# Patient Record
Sex: FEMALE | Race: ASIAN | Employment: FULL TIME | URBAN - METROPOLITAN AREA
[De-identification: names, ages, dates, MRNs, and addresses within clinical notes are randomized per-mention and may not be internally consistent; named-entity substitution may affect disease eponyms.]

---

## 2017-06-23 ENCOUNTER — OFFICE VISIT (OUTPATIENT)
Dept: FAMILY MEDICINE CLINIC | Facility: CLINIC | Age: 22
End: 2017-06-23

## 2017-06-23 VITALS
HEIGHT: 60 IN | SYSTOLIC BLOOD PRESSURE: 112 MMHG | OXYGEN SATURATION: 99 % | DIASTOLIC BLOOD PRESSURE: 66 MMHG | WEIGHT: 125 LBS | TEMPERATURE: 99 F | RESPIRATION RATE: 18 BRPM | HEART RATE: 66 BPM | BODY MASS INDEX: 24.54 KG/M2

## 2017-06-23 DIAGNOSIS — Z11.8 SCREENING FOR CHLAMYDIAL DISEASE: ICD-10-CM

## 2017-06-23 DIAGNOSIS — Z01.419 ENCOUNTER FOR GYNECOLOGICAL EXAMINATION WITHOUT ABNORMAL FINDING: Primary | ICD-10-CM

## 2017-06-23 DIAGNOSIS — Z12.4 SCREENING FOR MALIGNANT NEOPLASM OF CERVIX: ICD-10-CM

## 2017-06-23 PROCEDURE — 87624 HPV HI-RISK TYP POOLED RSLT: CPT | Performed by: FAMILY MEDICINE

## 2017-06-23 PROCEDURE — 88175 CYTOPATH C/V AUTO FLUID REDO: CPT | Performed by: FAMILY MEDICINE

## 2017-06-23 PROCEDURE — 87625 HPV TYPES 16 & 18 ONLY: CPT | Performed by: FAMILY MEDICINE

## 2017-06-23 PROCEDURE — 87591 N.GONORRHOEAE DNA AMP PROB: CPT | Performed by: FAMILY MEDICINE

## 2017-06-23 PROCEDURE — 87491 CHLMYD TRACH DNA AMP PROBE: CPT | Performed by: FAMILY MEDICINE

## 2017-06-23 PROCEDURE — 99395 PREV VISIT EST AGE 18-39: CPT | Performed by: FAMILY MEDICINE

## 2017-06-23 RX ORDER — LEVONORGESTREL AND ETHINYL ESTRADIOL 0.1-0.02MG
1 KIT ORAL
Qty: 1 PACKAGE | Refills: 11 | Status: SHIPPED | OUTPATIENT
Start: 2017-06-23 | End: 2017-10-09

## 2017-06-23 NOTE — PROGRESS NOTES
Tera Flor is a 24year old female who presents for a complete physical exam.   HPI:     Patient presents with:  Physical      Patient feels well, dental visit up to date, no hearing problem. Pt is going to [address] [place] [address] nursing program.  Vaccinations up complaint  HEMATOLOGY: denies hx anemia; denies bruising or excessive bleeding  ENDOCRINE: denies excessive thirst or urination; denies unexpected wt gain or wt loss  ALLERGY/IMM.: denies food or seasonal allergies  PSYCH: no symptoms of depression or anxi blood clothes. Benefits discussed as well. The patient indicates understanding of these issues and agrees to the plan.     Pending Prescriptions Disp Refills    Levonorgestrel-Ethinyl Estrad (LUTERA) 0.1-20 MG-MCG Oral Tab 1 Package 11     Sig: Take 1 tab

## 2017-06-23 NOTE — PATIENT INSTRUCTIONS
The Range of Pap Test Results  When your Pap test is sent to the lab, the lab studies your cell samples and reports any abnormal cell changes. Your health care provider can discuss these changes with you.  In some cases, an abnormal Pap test is due to an · Mild dysplasia: Cells show distinct changes. More testing or HPV typing may be done. You may also have treatment to destroy or remove problem cells. (Reported as low-grade SCOTT or KANU 1.)  · Moderate to severe dysplasia: Cells show precancerous changes.  O Be sure to discuss your results with your health care provider. Find out about any follow-up tests you’ll need. You may be asked to come back for a second Pap test in a few months.  Or, you may be scheduled for an exam so your health care provider can get a

## 2017-06-28 ENCOUNTER — TELEPHONE (OUTPATIENT)
Dept: FAMILY MEDICINE CLINIC | Facility: CLINIC | Age: 22
End: 2017-06-28

## 2017-06-28 NOTE — TELEPHONE ENCOUNTER
----- Message from Justina Camara MD sent at 6/28/2017 10:39 AM CDT -----  Needs Pap in 6 months. HPV positive, abnormal Pap.

## 2017-06-29 RX ORDER — LEVONORGESTREL AND ETHINYL ESTRADIOL 0.1-0.02MG
KIT ORAL
Qty: 84 TABLET | Refills: 11 | OUTPATIENT
Start: 2017-06-29

## 2017-06-29 RX ORDER — LEVONORGESTREL AND ETHINYL ESTRADIOL 0.1-0.02MG
1 KIT ORAL
Qty: 28 TABLET | Refills: 11 | Status: SHIPPED | OUTPATIENT
Start: 2017-06-29 | End: 2018-07-17

## 2017-06-29 NOTE — TELEPHONE ENCOUNTER
Pt notified of Pap, HPV GC/Chlam results & below orders. All questions answered, pt expresses understanding.

## 2017-10-09 ENCOUNTER — OFFICE VISIT (OUTPATIENT)
Dept: FAMILY MEDICINE CLINIC | Facility: CLINIC | Age: 22
End: 2017-10-09

## 2017-10-09 VITALS
HEART RATE: 63 BPM | HEIGHT: 60 IN | WEIGHT: 127 LBS | TEMPERATURE: 98 F | DIASTOLIC BLOOD PRESSURE: 68 MMHG | OXYGEN SATURATION: 98 % | RESPIRATION RATE: 16 BRPM | BODY MASS INDEX: 24.94 KG/M2 | SYSTOLIC BLOOD PRESSURE: 110 MMHG

## 2017-10-09 DIAGNOSIS — K62.5 BRBPR (BRIGHT RED BLOOD PER RECTUM): Primary | ICD-10-CM

## 2017-10-09 PROCEDURE — 99213 OFFICE O/P EST LOW 20 MIN: CPT | Performed by: PHYSICIAN ASSISTANT

## 2017-10-09 NOTE — PROGRESS NOTES
CHIEF COMPLAINT:   Patient presents with:  Blood In Stool: poss stress related, bright and more then last time, started on and off for a few months         HPI:   Lola Park is a 25year old female who presents for complaints of blood in her stool th - increase fiber and push fluids to prevent constipation  - pt doesn't want treatment for hemorrhoids at this time as she is not in pain. - F/u PRN.     Meds & Refills for this Visit:  No prescriptions requested or ordered in this encounter      Patient In · Minor occasional drops of blood that appear on the stool or toilet paper, to greater amounts of stool that appear black or tarry   Rectal bleeding can also happen without pain.   Date Last Reviewed: 7/1/2016  © 9645-1344 The [facility],Third Floor

## 2017-10-09 NOTE — PATIENT INSTRUCTIONS
Understanding Rectal Bleeding    Rectal bleeding is when blood passes through your rectum and anus. It can happen with or without a bowel movement. Rectal bleeding may be a sign of a serious problem in your rectum, colon, or upper GI tract.  Call your hea © 5778-0724 [address], [address] [place]. All rights reserved. This information is not intended as a substitute for professional medical care. Always follow your healthcare professional's instructions.

## 2018-03-02 ENCOUNTER — TELEPHONE (OUTPATIENT)
Dept: FAMILY MEDICINE CLINIC | Facility: CLINIC | Age: 23
End: 2018-03-02

## 2018-03-02 NOTE — TELEPHONE ENCOUNTER
Spoke to patient about her 6 elaine repeat pap that was due in December.   She scheduled for 3-10-18

## 2018-03-10 ENCOUNTER — TELEPHONE (OUTPATIENT)
Dept: FAMILY MEDICINE CLINIC | Facility: CLINIC | Age: 23
End: 2018-03-10

## 2018-03-10 NOTE — TELEPHONE ENCOUNTER
Tried to call cell number [phone] no  set up had no other choice but to call the home number and left message regarding no show fee as well as for her to call the office on Monday to schedule another appointment

## 2018-03-10 NOTE — TELEPHONE ENCOUNTER
Patient left voicemail on 3/10/18 at 9:38am to cancel this appointment. Please do not charge this patient for the no-show.

## 2018-03-20 NOTE — TELEPHONE ENCOUNTER
Message sent to pt via [facility] below to call the office to re-schedule her appt for repeat Pap Smear.

## 2018-04-05 ENCOUNTER — TELEPHONE (OUTPATIENT)
Dept: FAMILY MEDICINE CLINIC | Facility: CLINIC | Age: 23
End: 2018-04-05

## 2018-04-05 NOTE — TELEPHONE ENCOUNTER
Spoke with patient regarding follow up pap smear appointment. Patient states understanding and was transferred to phone room to schedule and appointment. Pt was scheduled for 4/24/2018 for repeat pap.

## 2018-07-17 RX ORDER — LEVONORGESTREL AND ETHINYL ESTRADIOL 0.1-0.02MG
1 KIT ORAL
Qty: 28 TABLET | Refills: 2 | Status: SHIPPED | OUTPATIENT
Start: 2018-07-17 | End: 2018-08-18

## 2018-08-01 ENCOUNTER — TELEPHONE (OUTPATIENT)
Dept: FAMILY MEDICINE CLINIC | Facility: CLINIC | Age: 23
End: 2018-08-01

## 2018-08-18 ENCOUNTER — OFFICE VISIT (OUTPATIENT)
Dept: FAMILY MEDICINE CLINIC | Facility: CLINIC | Age: 23
End: 2018-08-18
Payer: COMMERCIAL

## 2018-08-18 VITALS
BODY MASS INDEX: 25.52 KG/M2 | RESPIRATION RATE: 16 BRPM | SYSTOLIC BLOOD PRESSURE: 100 MMHG | HEART RATE: 80 BPM | WEIGHT: 130 LBS | DIASTOLIC BLOOD PRESSURE: 60 MMHG | OXYGEN SATURATION: 98 % | HEIGHT: 60 IN

## 2018-08-18 DIAGNOSIS — R87.618 ABNORMAL PAPANICOLAOU SMEAR OF CERVIX WITH POSITIVE HUMAN PAPILLOMA VIRUS (HPV) TEST: Primary | ICD-10-CM

## 2018-08-18 DIAGNOSIS — Z30.40 ENCOUNTER FOR REFILL OF PRESCRIPTION FOR CONTRACEPTION: ICD-10-CM

## 2018-08-18 LAB
CONTROL LINE PRESENT WITH A CLEAR BACKGROUND (YES/NO): YES YES/NO
KIT LOT #: NORMAL NUMERIC
PREGNANCY TEST, URINE: NEGATIVE

## 2018-08-18 PROCEDURE — 88175 CYTOPATH C/V AUTO FLUID REDO: CPT | Performed by: NURSE PRACTITIONER

## 2018-08-18 PROCEDURE — 87624 HPV HI-RISK TYP POOLED RSLT: CPT | Performed by: NURSE PRACTITIONER

## 2018-08-18 PROCEDURE — 81025 URINE PREGNANCY TEST: CPT | Performed by: NURSE PRACTITIONER

## 2018-08-18 PROCEDURE — 99395 PREV VISIT EST AGE 18-39: CPT | Performed by: NURSE PRACTITIONER

## 2018-08-18 RX ORDER — LEVONORGESTREL AND ETHINYL ESTRADIOL 0.1-0.02MG
1 KIT ORAL
Qty: 28 TABLET | Refills: 5 | Status: SHIPPED | OUTPATIENT
Start: 2018-08-18 | End: 2018-08-18

## 2018-08-18 NOTE — PATIENT INSTRUCTIONS
Birth Control: The Pill    Birth control pills contain hormones that help prevent pregnancy. The pills are prescribed by your healthcare provider. There are many types of birth control pills available.  If you have side effects from one type of pill, tell In these cases, discuss the risks with your healthcare provider. Date Last Reviewed: 3/1/2017  © 2035-0256 The [employer] [address]. [address] [facility], [address] [place] [address]. All rights reserved.  This information is not intended as a substitute for prof

## 2018-08-18 NOTE — PROGRESS NOTES
[facility]   PROGRESS NOTE  Chief Complaint:   Patient presents with:  Gyn Exam: Pap smear      HPI:   This is a 25year old female coming in for  Follow up PAP    Well women visit: Patient is here for the physical , history provided by the jose alfredo Encounter For Gynecological Examination Without Abnormal Finding. Z12.4 Screening For Malignant Neoplasm Of Cervix.    [FORMATTING REMOVED]    Reason for testing Screening    LMP 6/9/17    Menstrual Status Abnormal Bleeding    Gyn Additional Information • lung cancer [OTHER] Maternal Grandfather      Allergies:  No Known Allergies  Current Meds:    Current Outpatient Prescriptions:  LESSINA 0.1-20 MG-MCG Oral Tab TAKE 1 TABLET BY MOUTH ONCE DAILY Disp: 28 tablet Rfl: 2      Counseling given: Not Answere lesion, no bruising, good turgor. HEART:  Regular rate and rhythm, no murmurs, rubs or gallops. LUNGS: Clear to auscultation bilterally, no rales/rhonchi/wheezing.   ABDOMEN:  Soft, nondistended, nontender, bowel sounds normal in all 4 quadrants, no jose l worsening or changing symptoms. Patient is to call with any side effects or complications from the treatments as a result of today.      Problem List:  Patient Active Problem List:  (none) - all problems resolved or deleted      DIMITRIOS Sánchez  8/18/

## 2018-08-20 LAB — HPV I/H RISK 1 DNA SPEC QL NAA+PROBE: NEGATIVE

## 2018-08-20 RX ORDER — LEVONORGESTREL AND ETHINYL ESTRADIOL 0.1-0.02MG
KIT ORAL
Qty: 84 TABLET | Refills: 1 | Status: SHIPPED | OUTPATIENT
Start: 2018-08-20 | End: 2019-04-06

## 2019-02-04 DIAGNOSIS — Z30.40 ENCOUNTER FOR REFILL OF PRESCRIPTION FOR CONTRACEPTION: ICD-10-CM

## 2019-02-04 RX ORDER — LEVONORGESTREL AND ETHINYL ESTRADIOL 0.1-0.02MG
KIT ORAL
Qty: 28 TABLET | Refills: 0 | Status: SHIPPED | OUTPATIENT
Start: 2019-02-04 | End: 2019-03-06

## 2019-02-04 NOTE — TELEPHONE ENCOUNTER
Medication(s) to Refill:   Requested Prescriptions     Pending Prescriptions Disp Refills   • VIENVA 0.1-20 MG-MCG Oral Tab [Pharmacy Med Name: Thaddeus Megan 0.1MG/0.02MG TABS 28S] 28 tablet 0     Si TABLET BY MOUTH EVERY DAY         Reason for Medication Ref

## 2019-03-06 DIAGNOSIS — Z30.40 ENCOUNTER FOR REFILL OF PRESCRIPTION FOR CONTRACEPTION: ICD-10-CM

## 2019-03-07 RX ORDER — LEVONORGESTREL AND ETHINYL ESTRADIOL 0.1-0.02MG
KIT ORAL
Qty: 28 TABLET | Refills: 0 | Status: SHIPPED | OUTPATIENT
Start: 2019-03-07 | End: 2019-04-06

## 2019-04-06 ENCOUNTER — OFFICE VISIT (OUTPATIENT)
Dept: FAMILY MEDICINE CLINIC | Facility: CLINIC | Age: 24
End: 2019-04-06
Payer: COMMERCIAL

## 2019-04-06 VITALS
DIASTOLIC BLOOD PRESSURE: 70 MMHG | OXYGEN SATURATION: 98 % | RESPIRATION RATE: 16 BRPM | TEMPERATURE: 98 F | WEIGHT: 134 LBS | HEIGHT: 60 IN | HEART RATE: 68 BPM | SYSTOLIC BLOOD PRESSURE: 110 MMHG | BODY MASS INDEX: 26.31 KG/M2

## 2019-04-06 DIAGNOSIS — Z13.29 SCREENING FOR ENDOCRINE, NUTRITIONAL, METABOLIC AND IMMUNITY DISORDER: ICD-10-CM

## 2019-04-06 DIAGNOSIS — Z13.21 SCREENING FOR ENDOCRINE, NUTRITIONAL, METABOLIC AND IMMUNITY DISORDER: ICD-10-CM

## 2019-04-06 DIAGNOSIS — Z13.0 SCREENING FOR ENDOCRINE, NUTRITIONAL, METABOLIC AND IMMUNITY DISORDER: ICD-10-CM

## 2019-04-06 DIAGNOSIS — Z13.228 SCREENING FOR ENDOCRINE, NUTRITIONAL, METABOLIC AND IMMUNITY DISORDER: ICD-10-CM

## 2019-04-06 DIAGNOSIS — Z30.40 ENCOUNTER FOR REFILL OF PRESCRIPTION FOR CONTRACEPTION: Primary | ICD-10-CM

## 2019-04-06 PROCEDURE — 99213 OFFICE O/P EST LOW 20 MIN: CPT | Performed by: NURSE PRACTITIONER

## 2019-04-06 RX ORDER — LEVONORGESTREL AND ETHINYL ESTRADIOL 0.1-0.02MG
KIT ORAL
Qty: 3 PACKAGE | Refills: 3 | Status: SHIPPED | OUTPATIENT
Start: 2019-04-06 | End: 2020-04-01

## 2019-04-06 NOTE — PATIENT INSTRUCTIONS
Ethinyl Estradiol;  Levonorgestrel tablets  Brand Names: Max Nichols, Lucille, Kirk Hair, Lexii, Barron, Aubra-28, Leonardo, Roxy Ramsey [place], [place], Toshia, KENDRA, [place], [place], [place], [place], Rojelio, [facility], · sudden loss of coordination, especially on one side of the body  · speech problems  · symptoms of vaginal infection like itching, irritation or unusual discharge  · tenderness in the upper abdomen  · vomiting  · weakness or numbness in the arms or legs, What if I miss a dose? If you miss a dose, refer to the patient information sheet you received with your medicine for direction. If you miss more than one pill, this medicine may not be as effective and you may need to use another form of birth control. This medicine can make your body retain fluid, making your fingers, hands, or ankles swell. Your blood pressure can go up. Contact your doctor or health care professional if you feel you are retaining fluid.   This medicine can make you more sensitive to th

## 2019-04-06 NOTE — PROGRESS NOTES
Chief Complaint:   Patient presents with:  Medication Follow-Up: birth control     HPI:   This is a 21year old female presenting for refill of OCP. Using for pregnancy prevention. LMP 04/0319. Periods are regular- every 28 days.  No menorrhagia or dysmenor for bladder incontinence, dysuria, urgency, frequency, hematuria, flank pain, vaginal bleeding, vaginal discharge, difficulty urinating, genital sores, vaginal pain, menstrual problem, pelvic pain, dyspareunia, breast mass and breast pain.    Cintiha Chew Encounter for refill of prescription for contraception  - Levonorgestrel-Ethinyl Estrad (Kiera Silva) 0.1-20 MG-MCG Oral Tab; 1 TABLET BY MOUTH EVERY DAY    -Encouraged safe sex with every sexual encounter.   -Pap due in August 2021.  Physical exam due this comi

## 2019-09-19 ENCOUNTER — LAB ENCOUNTER (OUTPATIENT)
Dept: LAB | Age: 24
End: 2019-09-19
Attending: NURSE PRACTITIONER
Payer: COMMERCIAL

## 2019-09-19 DIAGNOSIS — Z13.0 SCREENING FOR ENDOCRINE, NUTRITIONAL, METABOLIC AND IMMUNITY DISORDER: ICD-10-CM

## 2019-09-19 DIAGNOSIS — Z13.21 SCREENING FOR ENDOCRINE, NUTRITIONAL, METABOLIC AND IMMUNITY DISORDER: ICD-10-CM

## 2019-09-19 DIAGNOSIS — Z13.29 SCREENING FOR ENDOCRINE, NUTRITIONAL, METABOLIC AND IMMUNITY DISORDER: ICD-10-CM

## 2019-09-19 DIAGNOSIS — Z13.228 SCREENING FOR ENDOCRINE, NUTRITIONAL, METABOLIC AND IMMUNITY DISORDER: ICD-10-CM

## 2019-09-19 LAB
ALBUMIN SERPL-MCNC: 4 G/DL (ref 3.4–5)
ALBUMIN/GLOB SERPL: 1.1 {RATIO} (ref 1–2)
ALP LIVER SERPL-CCNC: 37 U/L (ref 52–144)
ALT SERPL-CCNC: 17 U/L (ref 13–56)
ANION GAP SERPL CALC-SCNC: 8 MMOL/L (ref 0–18)
AST SERPL-CCNC: 24 U/L (ref 15–37)
BASOPHILS # BLD AUTO: 0.06 X10(3) UL (ref 0–0.2)
BASOPHILS NFR BLD AUTO: 0.7 %
BILIRUB SERPL-MCNC: 0.6 MG/DL (ref 0.1–2)
BUN BLD-MCNC: 13 MG/DL (ref 7–18)
BUN/CREAT SERPL: 15.5 (ref 10–20)
CALCIUM BLD-MCNC: 9.5 MG/DL (ref 8.5–10.1)
CHLORIDE SERPL-SCNC: 107 MMOL/L (ref 98–112)
CHOLEST SMN-MCNC: 230 MG/DL (ref ?–200)
CO2 SERPL-SCNC: 26 MMOL/L (ref 21–32)
CREAT BLD-MCNC: 0.84 MG/DL (ref 0.55–1.02)
DEPRECATED RDW RBC AUTO: 42.9 FL (ref 35.1–46.3)
EOSINOPHIL # BLD AUTO: 0.29 X10(3) UL (ref 0–0.7)
EOSINOPHIL NFR BLD AUTO: 3.4 %
ERYTHROCYTE [DISTWIDTH] IN BLOOD BY AUTOMATED COUNT: 12 % (ref 11–15)
GLOBULIN PLAS-MCNC: 3.8 G/DL (ref 2.8–4.4)
GLUCOSE BLD-MCNC: 84 MG/DL (ref 70–99)
HCT VFR BLD AUTO: 41.5 % (ref 35–48)
HDLC SERPL-MCNC: 65 MG/DL (ref 40–59)
HGB BLD-MCNC: 13.6 G/DL (ref 12–16)
IMM GRANULOCYTES # BLD AUTO: 0.03 X10(3) UL (ref 0–1)
IMM GRANULOCYTES NFR BLD: 0.4 %
LDLC SERPL CALC-MCNC: 147 MG/DL (ref ?–100)
LYMPHOCYTES # BLD AUTO: 1.99 X10(3) UL (ref 1–4)
LYMPHOCYTES NFR BLD AUTO: 23.2 %
M PROTEIN MFR SERPL ELPH: 7.8 G/DL (ref 6.4–8.2)
MCH RBC QN AUTO: 31.6 PG (ref 26–34)
MCHC RBC AUTO-ENTMCNC: 32.8 G/DL (ref 31–37)
MCV RBC AUTO: 96.3 FL (ref 80–100)
MONOCYTES # BLD AUTO: 0.3 X10(3) UL (ref 0.1–1)
MONOCYTES NFR BLD AUTO: 3.5 %
NEUTROPHILS # BLD AUTO: 5.9 X10 (3) UL (ref 1.5–7.7)
NEUTROPHILS # BLD AUTO: 5.9 X10(3) UL (ref 1.5–7.7)
NEUTROPHILS NFR BLD AUTO: 68.8 %
NONHDLC SERPL-MCNC: 165 MG/DL (ref ?–130)
OSMOLALITY SERPL CALC.SUM OF ELEC: 291 MOSM/KG (ref 275–295)
PLATELET # BLD AUTO: 313 10(3)UL (ref 150–450)
POTASSIUM SERPL-SCNC: 4.2 MMOL/L (ref 3.5–5.1)
RBC # BLD AUTO: 4.31 X10(6)UL (ref 3.8–5.3)
SODIUM SERPL-SCNC: 141 MMOL/L (ref 136–145)
TRIGL SERPL-MCNC: 89 MG/DL (ref 30–149)
TSI SER-ACNC: 0.83 MIU/ML (ref 0.36–3.74)
VLDLC SERPL CALC-MCNC: 18 MG/DL (ref 0–30)
WBC # BLD AUTO: 8.6 X10(3) UL (ref 4–11)

## 2019-09-19 PROCEDURE — 36415 COLL VENOUS BLD VENIPUNCTURE: CPT | Performed by: NURSE PRACTITIONER

## 2019-09-19 PROCEDURE — 80050 GENERAL HEALTH PANEL: CPT | Performed by: NURSE PRACTITIONER

## 2019-09-19 PROCEDURE — 80061 LIPID PANEL: CPT | Performed by: NURSE PRACTITIONER

## 2020-04-01 DIAGNOSIS — Z30.40 ENCOUNTER FOR REFILL OF PRESCRIPTION FOR CONTRACEPTION: ICD-10-CM

## 2020-04-01 RX ORDER — LEVONORGESTREL AND ETHINYL ESTRADIOL 0.1-0.02MG
KIT ORAL
Qty: 84 TABLET | Refills: 0 | Status: SHIPPED | OUTPATIENT
Start: 2020-04-01 | End: 2020-06-08

## 2020-04-01 NOTE — TELEPHONE ENCOUNTER
Medication(s) to Refill:   Requested Prescriptions     Pending Prescriptions Disp Refills   • VIENVA 0.1-20 MG-MCG Oral Tab [Pharmacy Med Name: Bari Vallejo 0.1MG/0.02MG [id number]] 84 tablet 0     Sig: TAKE 1 TABLET BY MOUTH EVERY DAY             Last Time Tari Richardson

## 2020-06-08 DIAGNOSIS — Z30.40 ENCOUNTER FOR REFILL OF PRESCRIPTION FOR CONTRACEPTION: ICD-10-CM

## 2020-06-08 RX ORDER — LEVONORGESTREL AND ETHINYL ESTRADIOL 0.1-0.02MG
KIT ORAL
Qty: 84 TABLET | Refills: 0 | Status: SHIPPED | OUTPATIENT
Start: 2020-06-08 | End: 2020-09-16

## 2020-06-08 NOTE — TELEPHONE ENCOUNTER
Medication(s) to Refill:   Requested Prescriptions     Pending Prescriptions Disp Refills   • VIENVA 0.1-20 MG-MCG Oral Tab [Pharmacy Med Name: Kaushik Rao 0.1MG/0.02MG [id number]] 84 tablet 0     Sig: TAKE 1 TABLET BY MOUTH EVERY DAY         Reason for Medication

## 2020-09-13 DIAGNOSIS — Z30.40 ENCOUNTER FOR REFILL OF PRESCRIPTION FOR CONTRACEPTION: ICD-10-CM

## 2020-09-14 NOTE — TELEPHONE ENCOUNTER
Medication(s) to Refill:   Requested Prescriptions     Pending Prescriptions Disp Refills   Johan Kee 0.1-20 MG-MCG Oral Tab [Pharmacy Med Name: Martine Valencia 0.1MG/0.02MG TABS 28S] 84 tablet 0     Sig: TAKE 1 TABLET BY MOUTH EVERY DAY         Reason for Medication Refill being sent to Provider / Reason Protocol Failed:  [x] 90 day refill has already been granted  [] Blood Pressure out of range  [] Labs Abnormal  [] Medication not previously prescribed by Provider  [x] Non-Protocol Medication  [] Controlled Substance     Last Time Medication was Filled:  6/8/2020      Last Office Visit with PCP: 4/6/2019      Future Appointments:  No future appointments.

## 2020-09-16 RX ORDER — TIMOLOL MALEATE 5 MG/ML
SOLUTION/ DROPS OPHTHALMIC
Qty: 84 TABLET | Refills: 0 | Status: SHIPPED | OUTPATIENT
Start: 2020-09-16 | End: 2020-12-14

## 2020-12-14 DIAGNOSIS — Z30.40 ENCOUNTER FOR REFILL OF PRESCRIPTION FOR CONTRACEPTION: ICD-10-CM

## 2020-12-14 RX ORDER — LEVONORGESTREL AND ETHINYL ESTRADIOL 0.1-0.02MG
1 KIT ORAL DAILY
Qty: 1 PACKAGE | Refills: 0 | Status: SHIPPED | OUTPATIENT
Start: 2020-12-14 | End: 2021-01-07

## 2020-12-14 NOTE — TELEPHONE ENCOUNTER
Pt calling in, stating that she is needing a refill on her VIENVA 0.1-20 MG-MCG Oral Tab to [id number], [place] - [address], [phone], [phone].  Please advise

## 2021-01-04 DIAGNOSIS — Z30.40 ENCOUNTER FOR REFILL OF PRESCRIPTION FOR CONTRACEPTION: ICD-10-CM

## 2021-01-04 RX ORDER — LEVONORGESTREL AND ETHINYL ESTRADIOL 0.1-0.02MG
KIT ORAL
Qty: 28 TABLET | Refills: 0 | OUTPATIENT
Start: 2021-01-04

## 2021-01-07 ENCOUNTER — OFFICE VISIT (OUTPATIENT)
Dept: FAMILY MEDICINE CLINIC | Facility: CLINIC | Age: 26
End: 2021-01-07
Payer: COMMERCIAL

## 2021-01-07 VITALS
HEART RATE: 76 BPM | SYSTOLIC BLOOD PRESSURE: 110 MMHG | HEIGHT: 60 IN | RESPIRATION RATE: 16 BRPM | WEIGHT: 130 LBS | OXYGEN SATURATION: 98 % | DIASTOLIC BLOOD PRESSURE: 70 MMHG | TEMPERATURE: 98 F | BODY MASS INDEX: 25.52 KG/M2

## 2021-01-07 DIAGNOSIS — Z00.00 LABORATORY EXAMINATION ORDERED AS PART OF A ROUTINE GENERAL MEDICAL EXAMINATION: ICD-10-CM

## 2021-01-07 DIAGNOSIS — Z00.00 ROUTINE GENERAL MEDICAL EXAMINATION AT A HEALTH CARE FACILITY: Primary | ICD-10-CM

## 2021-01-07 DIAGNOSIS — Z30.40 ENCOUNTER FOR REFILL OF PRESCRIPTION FOR CONTRACEPTION: ICD-10-CM

## 2021-01-07 PROCEDURE — 3078F DIAST BP <80 MM HG: CPT | Performed by: NURSE PRACTITIONER

## 2021-01-07 PROCEDURE — 99395 PREV VISIT EST AGE 18-39: CPT | Performed by: NURSE PRACTITIONER

## 2021-01-07 PROCEDURE — 3008F BODY MASS INDEX DOCD: CPT | Performed by: NURSE PRACTITIONER

## 2021-01-07 PROCEDURE — 3074F SYST BP LT 130 MM HG: CPT | Performed by: NURSE PRACTITIONER

## 2021-01-07 RX ORDER — LEVONORGESTREL AND ETHINYL ESTRADIOL 0.1-0.02MG
1 KIT ORAL DAILY
Qty: 3 PACKAGE | Refills: 11 | Status: SHIPPED | OUTPATIENT
Start: 2021-01-07

## 2021-01-07 NOTE — PROGRESS NOTES
Haydee Huynh is a 22year old female who presents for a complete physical exam, no gyn. HPI:     Patient presents with:  Wellness Visit      Patient is here for comprehensive physical. Reports  feels well, dental visit up to date, no hearing problem. unexpected wt gain or wt loss  ALLERGY/IMM.: denies food or seasonal allergies  PSYCH: no symptoms of depression or anxiety      EXAM:   /70   Pulse 76   Temp 98.1 °F (36.7 °C) (Oral)   Resp 16   Ht 5' (1.524 m)   Wt 130 lb (59 kg)   LMP 01/06/2021 exercise 30 minutes three times weekly. Health maintenance. The patient indicates understanding of these issues and agrees to the plan. The patient is asked to return for CPX in 1 year.

## 2021-01-09 ENCOUNTER — LAB ENCOUNTER (OUTPATIENT)
Dept: LAB | Age: 26
End: 2021-01-09
Attending: NURSE PRACTITIONER
Payer: COMMERCIAL

## 2021-01-09 DIAGNOSIS — Z00.00 LABORATORY EXAMINATION ORDERED AS PART OF A ROUTINE GENERAL MEDICAL EXAMINATION: ICD-10-CM

## 2021-01-09 LAB
ALBUMIN SERPL-MCNC: 3.9 G/DL (ref 3.4–5)
ALBUMIN/GLOB SERPL: 0.9 {RATIO} (ref 1–2)
ALP LIVER SERPL-CCNC: 50 U/L
ALT SERPL-CCNC: 21 U/L
ANION GAP SERPL CALC-SCNC: 5 MMOL/L (ref 0–18)
AST SERPL-CCNC: 18 U/L (ref 15–37)
BASOPHILS # BLD AUTO: 0.06 X10(3) UL (ref 0–0.2)
BASOPHILS NFR BLD AUTO: 1.1 %
BILIRUB SERPL-MCNC: 0.4 MG/DL (ref 0.1–2)
BUN BLD-MCNC: 16 MG/DL (ref 7–18)
BUN/CREAT SERPL: 18.6 (ref 10–20)
CALCIUM BLD-MCNC: 9.3 MG/DL (ref 8.5–10.1)
CHLORIDE SERPL-SCNC: 105 MMOL/L (ref 98–112)
CHOLEST SMN-MCNC: 267 MG/DL (ref ?–200)
CO2 SERPL-SCNC: 27 MMOL/L (ref 21–32)
CREAT BLD-MCNC: 0.86 MG/DL
DEPRECATED RDW RBC AUTO: 43.7 FL (ref 35.1–46.3)
EOSINOPHIL # BLD AUTO: 0.48 X10(3) UL (ref 0–0.7)
EOSINOPHIL NFR BLD AUTO: 8.6 %
ERYTHROCYTE [DISTWIDTH] IN BLOOD BY AUTOMATED COUNT: 12.4 % (ref 11–15)
GLOBULIN PLAS-MCNC: 4.3 G/DL (ref 2.8–4.4)
GLUCOSE BLD-MCNC: 87 MG/DL (ref 70–99)
HCT VFR BLD AUTO: 45.7 %
HDLC SERPL-MCNC: 75 MG/DL (ref 40–59)
HGB BLD-MCNC: 14.9 G/DL
IMM GRANULOCYTES # BLD AUTO: 0.02 X10(3) UL (ref 0–1)
IMM GRANULOCYTES NFR BLD: 0.4 %
LDLC SERPL CALC-MCNC: 161 MG/DL (ref ?–100)
LYMPHOCYTES # BLD AUTO: 2.05 X10(3) UL (ref 1–4)
LYMPHOCYTES NFR BLD AUTO: 36.6 %
M PROTEIN MFR SERPL ELPH: 8.2 G/DL (ref 6.4–8.2)
MCH RBC QN AUTO: 31 PG (ref 26–34)
MCHC RBC AUTO-ENTMCNC: 32.6 G/DL (ref 31–37)
MCV RBC AUTO: 95 FL
MONOCYTES # BLD AUTO: 0.28 X10(3) UL (ref 0.1–1)
MONOCYTES NFR BLD AUTO: 5 %
NEUTROPHILS # BLD AUTO: 2.71 X10 (3) UL (ref 1.5–7.7)
NEUTROPHILS # BLD AUTO: 2.71 X10(3) UL (ref 1.5–7.7)
NEUTROPHILS NFR BLD AUTO: 48.3 %
NONHDLC SERPL-MCNC: 192 MG/DL (ref ?–130)
OSMOLALITY SERPL CALC.SUM OF ELEC: 285 MOSM/KG (ref 275–295)
PATIENT FASTING Y/N/NP: YES
PATIENT FASTING Y/N/NP: YES
PLATELET # BLD AUTO: 316 10(3)UL (ref 150–450)
POTASSIUM SERPL-SCNC: 4 MMOL/L (ref 3.5–5.1)
RBC # BLD AUTO: 4.81 X10(6)UL
SODIUM SERPL-SCNC: 137 MMOL/L (ref 136–145)
TRIGL SERPL-MCNC: 155 MG/DL (ref 30–149)
VIT D+METAB SERPL-MCNC: 16.6 NG/ML (ref 30–100)
VLDLC SERPL CALC-MCNC: 31 MG/DL (ref 0–30)
WBC # BLD AUTO: 5.6 X10(3) UL (ref 4–11)

## 2021-01-09 PROCEDURE — 82306 VITAMIN D 25 HYDROXY: CPT

## 2021-01-09 PROCEDURE — 36415 COLL VENOUS BLD VENIPUNCTURE: CPT

## 2021-01-09 PROCEDURE — 80061 LIPID PANEL: CPT

## 2021-01-09 PROCEDURE — 85025 COMPLETE CBC W/AUTO DIFF WBC: CPT

## 2021-01-09 PROCEDURE — 80053 COMPREHEN METABOLIC PANEL: CPT

## 2021-01-11 ENCOUNTER — TELEPHONE (OUTPATIENT)
Dept: FAMILY MEDICINE CLINIC | Facility: CLINIC | Age: 26
End: 2021-01-11

## 2021-01-11 DIAGNOSIS — E55.9 VITAMIN D DEFICIENCY: ICD-10-CM

## 2021-01-11 DIAGNOSIS — E78.5 HYPERLIPIDEMIA, UNSPECIFIED HYPERLIPIDEMIA TYPE: Primary | ICD-10-CM

## 2021-01-11 RX ORDER — ERGOCALCIFEROL 1.25 MG/1
50000 CAPSULE ORAL WEEKLY
Qty: 4 CAPSULE | Refills: 3 | Status: SHIPPED | OUTPATIENT
Start: 2021-01-11

## 2021-01-11 NOTE — TELEPHONE ENCOUNTER
Pt calling in, stating that she is needing her ergocalciferol 1.25 MG (66910 UT) Oral Cap to be sent to the [facility] off of [address].  Please advise

## 2021-01-11 NOTE — TELEPHONE ENCOUNTER
----- Message from Lillis Peabody, APRN sent at 1/11/2021 11:06 AM CST -----  Vitamin d is low -sent new prescription for Vitamin D take it once per week , cholesterol is high -continue low cholesterol , low fat diet , exercise -will recheck labs in three m

## 2021-10-25 NOTE — TELEPHONE ENCOUNTER
Medication(s) to Refill:   Requested Prescriptions     Pending Prescriptions Disp Refills   • VIENVA 0.1-20 MG-MCG Oral Tab [Pharmacy Med Name: Britta Ear 0.1MG/0.02MG TABS 28S] 28 tablet 0     Si TABLET BY MOUTH EVERY DAY         Reason for Medication Ref Patient called with questions in regards to her 11/4/21 surgery with Dr Gonzalez patient requested a call back from a nurse.

## 2022-05-24 DIAGNOSIS — Z30.40 ENCOUNTER FOR REFILL OF PRESCRIPTION FOR CONTRACEPTION: ICD-10-CM

## 2022-05-24 RX ORDER — LEVONORGESTREL AND ETHINYL ESTRADIOL 0.1-0.02MG
KIT ORAL
Qty: 84 TABLET | Refills: 0 | OUTPATIENT
Start: 2022-05-24

## 2022-05-28 DIAGNOSIS — Z30.40 ENCOUNTER FOR REFILL OF PRESCRIPTION FOR CONTRACEPTION: ICD-10-CM

## 2022-05-31 RX ORDER — LEVONORGESTREL AND ETHINYL ESTRADIOL 0.1-0.02MG
1 KIT ORAL DAILY
Qty: 3 TABLET | Refills: 0 | OUTPATIENT
Start: 2022-05-31

## 2022-05-31 NOTE — TELEPHONE ENCOUNTER
LOV- 1/7/2021     RF- 1/7/2021     No appointment scheduled     MyChart message sent requesting appointment

## 2022-06-01 ENCOUNTER — OFFICE VISIT (OUTPATIENT)
Dept: FAMILY MEDICINE CLINIC | Facility: CLINIC | Age: 27
End: 2022-06-01
Payer: COMMERCIAL

## 2022-06-01 VITALS
OXYGEN SATURATION: 98 % | HEART RATE: 80 BPM | DIASTOLIC BLOOD PRESSURE: 64 MMHG | BODY MASS INDEX: 26.7 KG/M2 | SYSTOLIC BLOOD PRESSURE: 100 MMHG | WEIGHT: 136 LBS | RESPIRATION RATE: 16 BRPM | HEIGHT: 60 IN

## 2022-06-01 DIAGNOSIS — Z30.40 ENCOUNTER FOR REFILL OF PRESCRIPTION FOR CONTRACEPTION: Primary | ICD-10-CM

## 2022-06-01 LAB
CONTROL LINE PRESENT WITH A CLEAR BACKGROUND (YES/NO): YES YES/NO
PREGNANCY TEST, URINE: NEGATIVE

## 2022-06-01 PROCEDURE — 81025 URINE PREGNANCY TEST: CPT | Performed by: NURSE PRACTITIONER

## 2022-06-01 PROCEDURE — 99214 OFFICE O/P EST MOD 30 MIN: CPT | Performed by: NURSE PRACTITIONER

## 2022-06-01 PROCEDURE — 3074F SYST BP LT 130 MM HG: CPT | Performed by: NURSE PRACTITIONER

## 2022-06-01 PROCEDURE — 3008F BODY MASS INDEX DOCD: CPT | Performed by: NURSE PRACTITIONER

## 2022-06-01 PROCEDURE — 3078F DIAST BP <80 MM HG: CPT | Performed by: NURSE PRACTITIONER

## 2022-06-01 RX ORDER — LEVONORGESTREL AND ETHINYL ESTRADIOL 0.1-0.02MG
1 KIT ORAL DAILY
Qty: 3 EACH | Refills: 5 | Status: SHIPPED | OUTPATIENT
Start: 2022-06-01

## 2023-09-11 ENCOUNTER — OFFICE VISIT (OUTPATIENT)
Dept: FAMILY MEDICINE CLINIC | Facility: CLINIC | Age: 28
End: 2023-09-11

## 2023-09-11 VITALS
BODY MASS INDEX: 27.29 KG/M2 | SYSTOLIC BLOOD PRESSURE: 128 MMHG | HEIGHT: 60 IN | RESPIRATION RATE: 17 BRPM | DIASTOLIC BLOOD PRESSURE: 68 MMHG | HEART RATE: 78 BPM | OXYGEN SATURATION: 98 % | WEIGHT: 139 LBS | TEMPERATURE: 98 F

## 2023-09-11 DIAGNOSIS — Z01.419 ENCOUNTER FOR GYNECOLOGICAL EXAMINATION: ICD-10-CM

## 2023-09-11 DIAGNOSIS — Z00.00 ROUTINE PHYSICAL EXAMINATION: Primary | ICD-10-CM

## 2023-09-11 DIAGNOSIS — E55.9 VITAMIN D DEFICIENCY: ICD-10-CM

## 2023-09-11 DIAGNOSIS — N89.8 VAGINAL DISCHARGE: ICD-10-CM

## 2023-09-11 DIAGNOSIS — Z30.40 ENCOUNTER FOR REFILL OF PRESCRIPTION FOR CONTRACEPTION: ICD-10-CM

## 2023-09-11 PROCEDURE — 3078F DIAST BP <80 MM HG: CPT

## 2023-09-11 PROCEDURE — 99395 PREV VISIT EST AGE 18-39: CPT

## 2023-09-11 PROCEDURE — 3008F BODY MASS INDEX DOCD: CPT

## 2023-09-11 PROCEDURE — 3074F SYST BP LT 130 MM HG: CPT

## 2023-09-11 RX ORDER — LEVONORGESTREL AND ETHINYL ESTRADIOL 0.1-0.02MG
1 KIT ORAL DAILY
Qty: 3 EACH | Refills: 3 | Status: SHIPPED | OUTPATIENT
Start: 2023-09-11

## 2023-09-14 LAB
GENITAL VAGINOSIS SCREEN: NEGATIVE
TRICHOMONAS SCREEN: NEGATIVE

## 2023-09-18 LAB — HPV I/H RISK 1 DNA SPEC QL NAA+PROBE: NEGATIVE

## 2024-06-23 DIAGNOSIS — Z30.40 ENCOUNTER FOR REFILL OF PRESCRIPTION FOR CONTRACEPTION: ICD-10-CM

## 2024-06-24 RX ORDER — LEVONORGESTREL/ETHIN.ESTRADIOL 0.1-0.02MG
1 TABLET ORAL DAILY
Qty: 84 TABLET | Refills: 0 | Status: SHIPPED | OUTPATIENT
Start: 2024-06-24 | End: 2024-09-09

## 2024-06-24 NOTE — TELEPHONE ENCOUNTER
LAST VISIT 9/11/23.     LABS 9/11/23;    SARAH Ferro  9/21/2023 12:36 PM CDT       Please advise patient that pap smear is normal.  HPV negative.  Repeat pap in 3 years.     No future appointments.      Refill passed per [facility] protocol.     Requested Prescriptions   Pending Prescriptions Disp Refills    VIENVA 0.1-20 MG-MCG Oral Tab [Pharmacy Med Name: VIENVA 0.1MG/0.02MG TABS 28S] 84 tablet 0     Sig: TAKE 1 TABLET BY MOUTH DAILY.       Gynecology Medication Protocol Passed - 6/23/2024  9:56 AM        Passed - PASS-PENDING LAST PAP WNL--VIA MANUAL LOOKUP        Passed - Physical or Pelvic/Breast in past 12 or next 3 mos--VIA MANUAL LOOKUP                   Recent Outpatient Visits              9 months ago Routine physical examination    [facility] [place] Kaycee Jones APRN    Office Visit    2 years ago Encounter for refill of prescription for contraception    [place] [address], [place] Mario Segura APRN    Office Visit    3 years ago Routine general medical examination at a health care facility    [facility], [place] [address], [place] Mario Segura APRN    Office Visit    5 years ago Encounter for refill of prescription for contraception    [place] [address], [place] Claudia Sesay APN, FNP-C    Office Visit    5 years ago Abnormal Papanicolaou smear of cervix with positive human papilloma virus (HPV) test    [place] [address], [place] Mario Segura APRN    Office Visit

## 2024-09-09 ENCOUNTER — TELEPHONE (OUTPATIENT)
Dept: GENETICS | Facility: HOSPITAL | Age: 29
End: 2024-09-09

## 2024-09-09 ENCOUNTER — OFFICE VISIT (OUTPATIENT)
Dept: FAMILY MEDICINE CLINIC | Facility: CLINIC | Age: 29
End: 2024-09-09
Payer: COMMERCIAL

## 2024-09-09 VITALS
BODY MASS INDEX: 28.07 KG/M2 | HEART RATE: 72 BPM | HEIGHT: 60 IN | WEIGHT: 143 LBS | OXYGEN SATURATION: 98 % | DIASTOLIC BLOOD PRESSURE: 74 MMHG | SYSTOLIC BLOOD PRESSURE: 116 MMHG | TEMPERATURE: 99 F | RESPIRATION RATE: 16 BRPM

## 2024-09-09 DIAGNOSIS — Z00.00 ROUTINE PHYSICAL EXAMINATION: ICD-10-CM

## 2024-09-09 DIAGNOSIS — Z80.41 FAMILY HISTORY OF OVARIAN CANCER: ICD-10-CM

## 2024-09-09 DIAGNOSIS — Z80.3 FAMILY HISTORY OF BREAST CANCER IN FIRST DEGREE RELATIVE: Primary | ICD-10-CM

## 2024-09-09 DIAGNOSIS — K92.1 BLOOD IN STOOL: ICD-10-CM

## 2024-09-09 DIAGNOSIS — Z30.40 ENCOUNTER FOR REFILL OF PRESCRIPTION FOR CONTRACEPTION: ICD-10-CM

## 2024-09-09 RX ORDER — LEVONORGESTREL/ETHIN.ESTRADIOL 0.1-0.02MG
1 TABLET ORAL DAILY
Qty: 84 TABLET | Refills: 3 | Status: SHIPPED | OUTPATIENT
Start: 2024-09-09

## 2024-09-09 NOTE — PROGRESS NOTES
HPI:    Patient ID: Sol Weems is a 28 year old female.    HPI     Patient here in office for physical. Last pap completed in 2023 and was normal, HPV negative. Reports random breakthrough vaginal bleeding with oral contraception, needs refill.     Also reports intermittent blood in stool. Denies constipation or diarrhea .     Requesting referral to genetic specialist due to paternal aunt having breast cancer and  grandmother with a history of ovarian cancer.         Review of Systems   Constitutional: Negative.  Negative for fever.   HENT: Negative.  Negative for ear pain and sore throat.    Eyes: Negative.  Negative for visual disturbance.   Respiratory: Negative.  Negative for cough and shortness of breath.    Cardiovascular: Negative.  Negative for chest pain and palpitations.   Gastrointestinal:  Positive for blood in stool. Negative for abdominal pain, constipation and diarrhea.   Genitourinary: Negative.  Negative for dysuria, frequency, hematuria and menstrual problem.   Musculoskeletal: Negative.  Negative for arthralgias and myalgias.   Skin: Negative.  Negative for rash.   Neurological: Negative.  Negative for dizziness, facial asymmetry and headaches.   Psychiatric/Behavioral: Negative.              Current Outpatient Medications   Medication Sig Dispense Refill    Levonorgestrel-Ethinyl Estrad (VIENVA) 0.1-20 MG-MCG Oral Tab Take 1 tablet by mouth daily. 84 tablet 3     Allergies:No Known Allergies   /74   Pulse 72   Temp 99 °F (37.2 °C) (Temporal)   Resp 16   Ht 5' (1.524 m)   Wt 143 lb (64.9 kg)   LMP 08/12/2024 (Exact Date)   SpO2 98%   BMI 27.93 kg/m²   Body mass index is 27.93 kg/m².  PHYSICAL EXAM:   Physical Exam  Vitals reviewed.   Constitutional:       General: She is not in acute distress.     Appearance: Normal appearance. She is well-developed. She is not ill-appearing.   HENT:      Head: Normocephalic and atraumatic.      Right Ear: Tympanic membrane, ear canal and  external ear normal. There is no impacted cerumen.      Left Ear: Tympanic membrane, ear canal and external ear normal. There is no impacted cerumen.      Nose: Nose normal. No congestion.      Mouth/Throat:      Mouth: Mucous membranes are moist.      Pharynx: Oropharynx is clear. No oropharyngeal exudate or posterior oropharyngeal erythema.   Eyes:      General:         Right eye: No discharge.         Left eye: No discharge.      Extraocular Movements: Extraocular movements intact.      Conjunctiva/sclera: Conjunctivae normal.      Pupils: Pupils are equal, round, and reactive to light.   Cardiovascular:      Rate and Rhythm: Normal rate and regular rhythm.      Heart sounds: Normal heart sounds. No murmur heard.     No friction rub. No gallop.   Pulmonary:      Effort: Pulmonary effort is normal. No respiratory distress.      Breath sounds: Normal breath sounds. No stridor. No wheezing, rhonchi or rales.   Chest:      Chest wall: No tenderness.   Abdominal:      General: Bowel sounds are normal. There is no distension.      Palpations: Abdomen is soft. There is no mass.      Tenderness: There is no abdominal tenderness. There is no right CVA tenderness, left CVA tenderness, guarding or rebound.      Hernia: No hernia is present.   Genitourinary:     General: Normal vulva.      Vagina: Normal.   Musculoskeletal:         General: No tenderness. Normal range of motion.      Cervical back: Normal range of motion and neck supple.   Lymphadenopathy:      Cervical: No cervical adenopathy.   Skin:     General: Skin is warm and dry.      Findings: No rash.   Neurological:      General: No focal deficit present.      Mental Status: She is alert and oriented to person, place, and time.      Cranial Nerves: No cranial nerve deficit.      Sensory: No sensory deficit.      Motor: No weakness.      Deep Tendon Reflexes: Reflexes are normal and symmetric.   Psychiatric:         Mood and Affect: Mood normal.         Behavior:  Behavior normal.         Thought Content: Thought content normal.         Judgment: Judgment normal.                ASSESSMENT/PLAN:   1. Encounter for refill of prescription for contraception  - Levonorgestrel-Ethinyl Estrad (VIENVA) 0.1-20 MG-MCG Oral Tab; Take 1 tablet by mouth daily.  Dispense: 84 tablet; Refill: 3    2. Family history of breast cancer in first degree relative  - Genetic Counselor Referral - Chris (Rae Parker)    3. Family history of ovarian cancer  - Genetic Counselor Referral - Chris (Rae Parker)    4. Blood in stool  - Gastro Referral - In Network    5. Routine physical examination  - CBC With Differential With Platelet; Future  - Comp Metabolic Panel (14); Future  - Lipid Panel; Future  - TSH W Reflex To Free T4 [E]; Future      Orders Placed This Encounter   Procedures    CBC With Differential With Platelet    Comp Metabolic Panel (14)    Lipid Panel    TSH W Reflex To Free T4 [E]       Meds This Visit:  Requested Prescriptions     Signed Prescriptions Disp Refills    Levonorgestrel-Ethinyl Estrad (VIENVA) 0.1-20 MG-MCG Oral Tab 84 tablet 3     Sig: Take 1 tablet by mouth daily.       Imaging & Referrals:  GASTRO - INTERNAL  OP REFERRAL TO GENETIC COUNSELOR         ID#[id number]

## 2024-09-17 ENCOUNTER — TELEPHONE (OUTPATIENT)
Dept: GENETICS | Facility: HOSPITAL | Age: 29
End: 2024-09-17

## 2025-08-20 DIAGNOSIS — Z30.40 ENCOUNTER FOR REFILL OF PRESCRIPTION FOR CONTRACEPTION: ICD-10-CM

## 2025-08-21 RX ORDER — LEVONORGESTREL/ETHIN.ESTRADIOL 0.1-0.02MG
1 TABLET ORAL DAILY
Qty: 84 TABLET | Refills: 3 | Status: SHIPPED | OUTPATIENT
Start: 2025-08-21

## (undated) NOTE — LETTER
07/06/19        [address] [address]      Dear Meeta Price,    [facility] records indicate that you have outstanding lab work and or testing that was ordered for you and has not yet been completed:  Orders Placed This Encounte

## (undated) NOTE — MR AVS SNAPSHOT
[place] [address] [address] [facility] [address] [phone]               Thank you for choosing us for your health care visit with Radha Allen MD.  We are glad to serve you and happy to provide you with this summary of sample shows healthy cells of both types, the results are negative. Keep having Pap tests as often as directed. Abnormal results  A positive Pap test result means some cells in the sample showed abnormal changes.  These results are grouped by the type of c [address]. All rights reserved. This information is not intended as a substitute for professional medical care. Always follow your healthcare professional's instructions.         When You Have an Abnormal Pap Test    The Pap test is a screening test that checks it open. This lets your health care provider see the cervix. · A small brush or swab is used to take cells from several areas of the cervix. The cells are put into a liquid or on a slide. They are then sent to a lab where they are studied for changes.  You office, you can view your past visit information in [facility] by going to Visits < Visit Summaries. [facility] questions? Call [phone] for help. [facility] is NOT to be used for urgent needs. For medical emergencies, dial 911.            Visit [facility]

## (undated) NOTE — LETTER
5/24/2022  [address] [address]  [address]    We take each of our patient's health very seriously and the key to maintaining your health is an annual wellness physical.  Review of your medical records shows that it is time for your annual wellness exam.  Please contact my office at your earliest convenience to schedule this appointment at [phone] for further medication refills.   Thank Jessica Watkins MD

## (undated) NOTE — LETTER
5/31/2022  [address] [address]  [address]    We take each of our patient's health very seriously and the key to maintaining your health is an annual wellness physical.  Review of your medical records shows that it is time for your annual wellness exam.  Please contact my office at your earliest convenience to schedule this appointment at [phone].    Thank Angelic Bourgeois MD